# Patient Record
Sex: MALE
[De-identification: names, ages, dates, MRNs, and addresses within clinical notes are randomized per-mention and may not be internally consistent; named-entity substitution may affect disease eponyms.]

---

## 2024-06-21 ENCOUNTER — DASHBOARD ENCOUNTERS (OUTPATIENT)
Age: 44
End: 2024-06-21

## 2024-06-21 ENCOUNTER — OFFICE VISIT (OUTPATIENT)
Dept: URBAN - METROPOLITAN AREA CLINIC 113 | Facility: CLINIC | Age: 44
End: 2024-06-21
Payer: COMMERCIAL

## 2024-06-21 VITALS
TEMPERATURE: 97.5 F | HEIGHT: 70 IN | SYSTOLIC BLOOD PRESSURE: 112 MMHG | BODY MASS INDEX: 26.6 KG/M2 | HEART RATE: 82 BPM | WEIGHT: 185.8 LBS | DIASTOLIC BLOOD PRESSURE: 77 MMHG | RESPIRATION RATE: 20 BRPM

## 2024-06-21 DIAGNOSIS — R74.8 ELEVATED LIVER ENZYMES: ICD-10-CM

## 2024-06-21 DIAGNOSIS — Z78.9 ALCOHOL USE: ICD-10-CM

## 2024-06-21 PROBLEM — 219006: Status: ACTIVE | Noted: 2024-06-21

## 2024-06-21 PROCEDURE — 99203 OFFICE O/P NEW LOW 30 MIN: CPT | Performed by: INTERNAL MEDICINE

## 2024-06-21 RX ORDER — VENLAFAXINE HYDROCHLORIDE 75 MG/1
1 TABLET WITH FOOD TABLET ORAL ONCE A DAY
Status: ACTIVE | COMMUNITY

## 2024-06-21 NOTE — HPI-OTHER HISTORIES
Abdominal ultrasound on 1/25/2024 revealed an echogenic liver consistent with fatty infiltration with no liver lesions.  Gallbladder was normal there was a 2.7 mm common bile duct.

## 2024-06-21 NOTE — HPI-TODAY'S VISIT:
44-year-old with a history of hypertension, stroke, anxiety and depression referred by Dr. Trudy Reyes for evaluation of elevated liver enzymes.  A copy of this report will be sent to her office.  Patient has been using alcohol for many years he was in rehab about 4 to 5 years ago and did have elevated liver enzymes that were significant and spent 8 days there.  He gets heartburn about once a week there is no dysphagia or abdominal pain.  His weight has been increasing.  There is no nausea or vomiting however if he has an anxiety episode he will have a tickle in his throat and cough until he vomits.  He typically moves his bowels every day there is been no blood or melena.  He does drink about 12 beers per day.  Blood work on 1/4/2024 revealed a sodium 128 potassium 5.7 BUN 7 creatinine 0.88.  Hemoglobin 16.5 WBC of 6.4  platelet count of 354,000.  B12 is 314 folate is 7.8.  AST 81, ALT 71, alk phosphatase 87, total bili 0.8, albumin 5.3, TSH 1.6.  On 4/26/2024 , , alkaline phosphatase 101, total bili 0.6.

## 2024-06-28 LAB
A/G RATIO: 2.1
ABSOLUTE BASOPHILS: 112
ABSOLUTE EOSINOPHILS: 336
ABSOLUTE LYMPHOCYTES: 1548
ABSOLUTE MONOCYTES: 364
ABSOLUTE NEUTROPHILS: 1640
ACTIN (SMOOTH MUSCLE) ANTIBODY (IGG): <20
ALBUMIN: 4.8
ALKALINE PHOSPHATASE: 70
ALPHA-1-ANTITRYPSIN QN: 126
ALT (SGPT): 74
ANA SCREEN, IFA: NEGATIVE
AST (SGOT): 64
BASOPHILS: 2.8
BILIRUBIN, TOTAL: 0.6
BUN/CREATININE RATIO: 5
BUN: 4
CALCIUM: 9.6
CARBON DIOXIDE, TOTAL: 25
CERULOPLASMIN: 22
CHLORIDE: 95
CREATININE: 0.77
EGFR: 114
EOSINOPHILS: 8.4
FERRITIN, SERUM: 121
GLOBULIN, TOTAL: 2.3
GLUCOSE: 90
HEMATOCRIT: 36
HEMOGLOBIN: 12.5
HEPATITIS A AB, TOTAL: (no result)
HEPATITIS B CORE AB TOTAL: (no result)
HEPATITIS B SURFACE AB IMMUNITY, QN: >1000
HEPATITIS B SURFACE ANTIGEN: (no result)
HEPATITIS C ANTIBODY: (no result)
IMMUNOGLOBULIN A: 179
IMMUNOGLOBULIN G: 916
IMMUNOGLOBULIN M: 158
INR: 0.9
IRON BIND.CAP.(TIBC): 331
IRON SATURATION: 59
IRON: 195
LKM-1 ANTIBODY (IGG): <=20
LYMPHOCYTES: 38.7
MCH: 35.8
MCHC: 34.7
MCV: 103.2
MITOCHONDRIAL (M2) ANTIBODY: <=20
MONOCYTES: 9.1
MPV: 8.7
NEUTROPHILS: 41
PLATELET COUNT: 258
POTASSIUM: 4.4
PROTEIN, TOTAL: 7.1
PT: 9.8
RDW: 12.6
RED BLOOD CELL COUNT: 3.49
SODIUM: 131
WHITE BLOOD CELL COUNT: 4

## 2024-06-30 ENCOUNTER — TELEPHONE ENCOUNTER (OUTPATIENT)
Dept: URBAN - METROPOLITAN AREA CLINIC 113 | Facility: CLINIC | Age: 44
End: 2024-06-30

## 2024-06-30 PROBLEM — 60737008: Status: ACTIVE | Noted: 2024-06-30
